# Patient Record
Sex: MALE | Race: BLACK OR AFRICAN AMERICAN | NOT HISPANIC OR LATINO | Employment: FULL TIME | URBAN - METROPOLITAN AREA
[De-identification: names, ages, dates, MRNs, and addresses within clinical notes are randomized per-mention and may not be internally consistent; named-entity substitution may affect disease eponyms.]

---

## 2018-01-22 ENCOUNTER — HOSPITAL ENCOUNTER (EMERGENCY)
Facility: HOSPITAL | Age: 28
Discharge: HOME | End: 2018-01-22
Attending: EMERGENCY MEDICINE

## 2018-01-22 VITALS
TEMPERATURE: 100 F | OXYGEN SATURATION: 98 % | RESPIRATION RATE: 24 BRPM | SYSTOLIC BLOOD PRESSURE: 174 MMHG | BODY MASS INDEX: 41.75 KG/M2 | WEIGHT: 315 LBS | DIASTOLIC BLOOD PRESSURE: 93 MMHG | HEIGHT: 73 IN | HEART RATE: 99 BPM

## 2018-01-22 DIAGNOSIS — F41.1 ANXIETY REACTION: Primary | ICD-10-CM

## 2018-01-22 LAB
ALBUMIN SERPL BCP-MCNC: 3.5 G/DL
ALP SERPL-CCNC: 76 U/L
ALT SERPL W/O P-5'-P-CCNC: 27 U/L
ANION GAP SERPL CALC-SCNC: 11 MMOL/L
AST SERPL-CCNC: 24 U/L
BASOPHILS # BLD AUTO: 0 K/UL
BASOPHILS NFR BLD: 0.5 %
BILIRUB SERPL-MCNC: 0.2 MG/DL
BUN SERPL-MCNC: 20 MG/DL
CALCIUM SERPL-MCNC: 9 MG/DL
CHLORIDE SERPL-SCNC: 107 MMOL/L
CO2 SERPL-SCNC: 24 MMOL/L
CREAT SERPL-MCNC: 1.2 MG/DL
DIFFERENTIAL METHOD: ABNORMAL
EOSINOPHIL # BLD AUTO: 0.1 K/UL
EOSINOPHIL NFR BLD: 1.5 %
ERYTHROCYTE [DISTWIDTH] IN BLOOD BY AUTOMATED COUNT: 14.4 %
EST. GFR  (AFRICAN AMERICAN): >60 ML/MIN/1.73 M^2
EST. GFR  (NON AFRICAN AMERICAN): >60 ML/MIN/1.73 M^2
GLUCOSE SERPL-MCNC: 95 MG/DL
HCT VFR BLD AUTO: 41.6 %
HGB BLD-MCNC: 13.9 G/DL
LYMPHOCYTES # BLD AUTO: 2.7 K/UL
LYMPHOCYTES NFR BLD: 34.8 %
MAGNESIUM SERPL-MCNC: 2 MG/DL
MCH RBC QN AUTO: 27.3 PG
MCHC RBC AUTO-ENTMCNC: 33.5 G/DL
MCV RBC AUTO: 82 FL
MONOCYTES # BLD AUTO: 0.6 K/UL
MONOCYTES NFR BLD: 7.6 %
NEUTROPHILS # BLD AUTO: 4.4 K/UL
NEUTROPHILS NFR BLD: 55.6 %
PHOSPHATE SERPL-MCNC: 4 MG/DL
PLATELET # BLD AUTO: 211 K/UL
PMV BLD AUTO: 9.1 FL
POTASSIUM SERPL-SCNC: 3.8 MMOL/L
PROT SERPL-MCNC: 7.1 G/DL
RBC # BLD AUTO: 5.1 M/UL
SODIUM SERPL-SCNC: 142 MMOL/L
WBC # BLD AUTO: 7.9 K/UL

## 2018-01-22 PROCEDURE — 36415 COLL VENOUS BLD VENIPUNCTURE: CPT

## 2018-01-22 PROCEDURE — 99283 EMERGENCY DEPT VISIT LOW MDM: CPT

## 2018-01-22 PROCEDURE — 83735 ASSAY OF MAGNESIUM: CPT

## 2018-01-22 PROCEDURE — 85025 COMPLETE CBC W/AUTO DIFF WBC: CPT

## 2018-01-22 PROCEDURE — 84100 ASSAY OF PHOSPHORUS: CPT

## 2018-01-22 PROCEDURE — 80053 COMPREHEN METABOLIC PANEL: CPT

## 2018-01-22 RX ORDER — CITALOPRAM 40 MG/1
40 TABLET, FILM COATED ORAL DAILY
COMMUNITY

## 2018-01-23 NOTE — ED PROVIDER NOTES
Encounter Date: 1/22/2018    SCRIBE #1 NOTE: I, Odilia Mckinnon, am scribing for, and in the presence of, Dr. Nuñez .       History     Chief Complaint   Patient presents with    General Illness     hands been locking up and trouble breathing since eating a canned food with a dent in it. Thinks he has botulism.      01/22/2018  9:02 PM     Chief Complaint: General Illness    Mauro Beckford is a 27 y.o. male who is presenting to ED for evaluation of general illness, which started yesterday after eating canned food. He endorse nausea, SOB, difficulty with speech, tremors and numbness in his hands. He describes his numbness as hands locking up. Denies fever, vomiting, visual disturbance, or diarrhea. Pt notes that he is a  and does not have a PCP in this state. Pt has a past medical history of depression and anxiety. He notes that he takes 40 mg of Citalopram daily. Pt has no pshx on file.         The history is provided by the patient.     Review of patient's allergies indicates:  No Known Allergies  Past Medical History:   Diagnosis Date    Depression      History reviewed. No pertinent surgical history.  History reviewed. No pertinent family history.  Social History   Substance Use Topics    Smoking status: Current Every Day Smoker     Packs/day: 0.25     Types: Cigarettes    Smokeless tobacco: Not on file    Alcohol use No     Review of Systems   Constitutional: Negative for activity change, appetite change, chills, fatigue and fever.   Eyes: Negative for visual disturbance.   Respiratory: Positive for shortness of breath. Negative for apnea.    Cardiovascular: Negative for chest pain and palpitations.   Gastrointestinal: Positive for nausea. Negative for abdominal distention, abdominal pain, diarrhea and vomiting.   Genitourinary: Negative for difficulty urinating.   Musculoskeletal: Negative for neck pain.   Skin: Negative for pallor and rash.   Neurological: Positive for tremors, speech  difficulty and numbness. Negative for headaches.   Hematological: Does not bruise/bleed easily.   Psychiatric/Behavioral: Negative for agitation.       Physical Exam     Initial Vitals [01/22/18 2029]   BP Pulse Resp Temp SpO2   (!) 174/93 99 (!) 24 99.6 °F (37.6 °C) 98 %      MAP       120         Physical Exam    Nursing note and vitals reviewed.  Constitutional: He appears well-developed and well-nourished.   HENT:   Head: Normocephalic and atraumatic.   Eyes: Conjunctivae are normal.   Neck: Normal range of motion. Neck supple.   Cardiovascular: Normal rate, regular rhythm and normal heart sounds. Exam reveals no gallop and no friction rub.    No murmur heard.  Pulmonary/Chest: Breath sounds normal. No respiratory distress. He has no wheezes. He has no rhonchi. He has no rales.   Abdominal: Soft.   Musculoskeletal: Normal range of motion.   Neurological: He is alert and oriented to person, place, and time.   Skin: Skin is warm and dry.   Psychiatric: He has a normal mood and affect.         ED Course   Procedures  Labs Reviewed   CBC W/ AUTO DIFFERENTIAL   COMPREHENSIVE METABOLIC PANEL   MAGNESIUM   PHOSPHORUS             Medical Decision Making:   ED Management:  Mauro Beckford Jr. is a 27 y.o. male who presents with  shortness of breath with associated acroparesthesias.  The symptoms are consistent with an anxiety reaction.  He has a history of same.  He expresses concerns about botulism; however, he has no upper body neurologic deficits with a normal cranial nerve exam, no diplopia, ptosis or slurred speech.       APC / Resident Notes:   I, Dr. Teja Nuñez III, personally performed the services described in this documentation. All medical record entries made by the scribe were at my direction and in my presence.  I have reviewed the chart and agree that the record reflects my personal performance and is accurate and complete. Teja Nuñez III, MD.  10:26 PM 01/22/2018       Scribe Attestation:    Scribe #1: I performed the above scribed service and the documentation accurately describes the services I performed. I attest to the accuracy of the note.            ED Course      Clinical Impression:   No diagnosis found.                             Teja Nuñez III, MD  01/22/18 [id number]